# Patient Record
Sex: FEMALE | Race: WHITE | NOT HISPANIC OR LATINO | Employment: OTHER | URBAN - METROPOLITAN AREA
[De-identification: names, ages, dates, MRNs, and addresses within clinical notes are randomized per-mention and may not be internally consistent; named-entity substitution may affect disease eponyms.]

---

## 2022-07-20 ENCOUNTER — LAB REQUISITION (OUTPATIENT)
Dept: LAB | Facility: CLINIC | Age: 67
End: 2022-07-20

## 2022-07-20 LAB
ESTRADIOL SERPL-MCNC: 16 PG/ML
PROGEST SERPL-MCNC: 1.2 NG/ML

## 2022-07-20 PROCEDURE — 82670 ASSAY OF TOTAL ESTRADIOL: CPT | Performed by: FAMILY MEDICINE

## 2022-07-20 PROCEDURE — 84144 ASSAY OF PROGESTERONE: CPT | Performed by: FAMILY MEDICINE

## 2022-07-28 LAB
HOLD SPECIMEN: NORMAL
HOLD SPECIMEN: NORMAL

## 2022-12-06 ENCOUNTER — HOSPITAL ENCOUNTER (EMERGENCY)
Facility: CLINIC | Age: 67
Discharge: HOME | End: 2022-12-06
Attending: PHYSICIAN ASSISTANT | Admitting: PHYSICIAN ASSISTANT
Payer: MEDICARE

## 2022-12-06 ENCOUNTER — APPOINTMENT (OUTPATIENT)
Dept: CT IMAGING | Facility: CLINIC | Age: 67
End: 2022-12-06
Attending: PHYSICIAN ASSISTANT
Payer: MEDICARE

## 2022-12-06 VITALS
OXYGEN SATURATION: 99 % | SYSTOLIC BLOOD PRESSURE: 132 MMHG | HEART RATE: 62 BPM | WEIGHT: 140 LBS | DIASTOLIC BLOOD PRESSURE: 84 MMHG | RESPIRATION RATE: 24 BRPM | BODY MASS INDEX: 21.22 KG/M2 | TEMPERATURE: 97.5 F | HEIGHT: 68 IN

## 2022-12-06 DIAGNOSIS — K62.5 BRBPR (BRIGHT RED BLOOD PER RECTUM): ICD-10-CM

## 2022-12-06 DIAGNOSIS — K52.9 COLITIS: Primary | ICD-10-CM

## 2022-12-06 LAB
ABO/RH(D): NORMAL
ALBUMIN SERPL BCG-MCNC: 4.4 G/DL (ref 3.5–5.2)
ALP SERPL-CCNC: 53 U/L (ref 35–104)
ALT SERPL W P-5'-P-CCNC: 18 U/L (ref 10–35)
ANION GAP SERPL CALCULATED.3IONS-SCNC: 11 MMOL/L (ref 7–15)
ANTIBODY SCREEN: NEGATIVE
AST SERPL W P-5'-P-CCNC: 25 U/L (ref 10–35)
BASOPHILS # BLD AUTO: 0 10E3/UL (ref 0–0.2)
BASOPHILS NFR BLD AUTO: 0 %
BILIRUB DIRECT SERPL-MCNC: <0.2 MG/DL (ref 0–0.3)
BILIRUB SERPL-MCNC: 0.9 MG/DL
BUN SERPL-MCNC: 15.6 MG/DL (ref 8–23)
C COLI+JEJUNI+LARI FUSA STL QL NAA+PROBE: NOT DETECTED
CALCIUM SERPL-MCNC: 8.8 MG/DL (ref 8.8–10.2)
CHLORIDE SERPL-SCNC: 100 MMOL/L (ref 98–107)
CREAT SERPL-MCNC: 0.82 MG/DL (ref 0.51–0.95)
DEPRECATED HCO3 PLAS-SCNC: 24 MMOL/L (ref 22–29)
EC STX1 GENE STL QL NAA+PROBE: NOT DETECTED
EC STX2 GENE STL QL NAA+PROBE: NOT DETECTED
EOSINOPHIL # BLD AUTO: 0 10E3/UL (ref 0–0.7)
EOSINOPHIL NFR BLD AUTO: 1 %
ERYTHROCYTE [DISTWIDTH] IN BLOOD BY AUTOMATED COUNT: 13.6 % (ref 10–15)
GFR SERPL CREATININE-BSD FRML MDRD: 78 ML/MIN/1.73M2
GLUCOSE SERPL-MCNC: 124 MG/DL (ref 70–99)
HCT VFR BLD AUTO: 41.8 % (ref 35–47)
HGB BLD-MCNC: 13.6 G/DL (ref 11.7–15.7)
HOLD SPECIMEN: NORMAL
IMM GRANULOCYTES # BLD: 0 10E3/UL
IMM GRANULOCYTES NFR BLD: 0 %
LYMPHOCYTES # BLD AUTO: 1.7 10E3/UL (ref 0.8–5.3)
LYMPHOCYTES NFR BLD AUTO: 19 %
MCH RBC QN AUTO: 30.6 PG (ref 26.5–33)
MCHC RBC AUTO-ENTMCNC: 32.5 G/DL (ref 31.5–36.5)
MCV RBC AUTO: 94 FL (ref 78–100)
MONOCYTES # BLD AUTO: 0.6 10E3/UL (ref 0–1.3)
MONOCYTES NFR BLD AUTO: 7 %
NEUTROPHILS # BLD AUTO: 6.5 10E3/UL (ref 1.6–8.3)
NEUTROPHILS NFR BLD AUTO: 73 %
NOROV GI+II ORF1-ORF2 JNC STL QL NAA+PR: NOT DETECTED
NRBC # BLD AUTO: 0 10E3/UL
NRBC BLD AUTO-RTO: 0 /100
PLATELET # BLD AUTO: 229 10E3/UL (ref 150–450)
POTASSIUM SERPL-SCNC: 3.6 MMOL/L (ref 3.4–5.3)
PROT SERPL-MCNC: 6.6 G/DL (ref 6.4–8.3)
RBC # BLD AUTO: 4.44 10E6/UL (ref 3.8–5.2)
RVA NSP5 STL QL NAA+PROBE: NOT DETECTED
SALMONELLA SP RPOD STL QL NAA+PROBE: NOT DETECTED
SHIGELLA SP+EIEC IPAH STL QL NAA+PROBE: NOT DETECTED
SODIUM SERPL-SCNC: 135 MMOL/L (ref 136–145)
SPECIMEN EXPIRATION DATE: NORMAL
V CHOL+PARA RFBL+TRKH+TNAA STL QL NAA+PR: NOT DETECTED
WBC # BLD AUTO: 8.9 10E3/UL (ref 4–11)
Y ENTERO RECN STL QL NAA+PROBE: NOT DETECTED

## 2022-12-06 PROCEDURE — 85025 COMPLETE CBC W/AUTO DIFF WBC: CPT | Performed by: PHYSICIAN ASSISTANT

## 2022-12-06 PROCEDURE — 99285 EMERGENCY DEPT VISIT HI MDM: CPT | Mod: 25

## 2022-12-06 PROCEDURE — 82248 BILIRUBIN DIRECT: CPT | Performed by: PHYSICIAN ASSISTANT

## 2022-12-06 PROCEDURE — 80053 COMPREHEN METABOLIC PANEL: CPT | Performed by: EMERGENCY MEDICINE

## 2022-12-06 PROCEDURE — 80048 BASIC METABOLIC PNL TOTAL CA: CPT | Performed by: PHYSICIAN ASSISTANT

## 2022-12-06 PROCEDURE — 74177 CT ABD & PELVIS W/CONTRAST: CPT | Mod: MA

## 2022-12-06 PROCEDURE — 85025 COMPLETE CBC W/AUTO DIFF WBC: CPT | Performed by: EMERGENCY MEDICINE

## 2022-12-06 PROCEDURE — 86901 BLOOD TYPING SEROLOGIC RH(D): CPT | Performed by: PHYSICIAN ASSISTANT

## 2022-12-06 PROCEDURE — 250N000011 HC RX IP 250 OP 636: Performed by: PHYSICIAN ASSISTANT

## 2022-12-06 PROCEDURE — 36415 COLL VENOUS BLD VENIPUNCTURE: CPT | Performed by: EMERGENCY MEDICINE

## 2022-12-06 PROCEDURE — 87506 IADNA-DNA/RNA PROBE TQ 6-11: CPT | Performed by: PHYSICIAN ASSISTANT

## 2022-12-06 RX ORDER — IOPAMIDOL 755 MG/ML
500 INJECTION, SOLUTION INTRAVASCULAR ONCE
Status: COMPLETED | OUTPATIENT
Start: 2022-12-06 | End: 2022-12-06

## 2022-12-06 RX ADMIN — IOPAMIDOL 71 ML: 755 INJECTION, SOLUTION INTRAVENOUS at 10:43

## 2022-12-06 ASSESSMENT — ENCOUNTER SYMPTOMS
BLOOD IN STOOL: 1
ABDOMINAL PAIN: 1

## 2022-12-06 ASSESSMENT — ACTIVITIES OF DAILY LIVING (ADL): ADLS_ACUITY_SCORE: 35

## 2022-12-06 NOTE — ED PROVIDER NOTES
History     Chief Complaint:  Rectal Bleeding       HPI   Diana Mcgovern is a 67 year old female without cardiac history who presents to the ED for evaluation of abdominal pain and BRBPR.  Patient reports developing diffuse abdominal pain that began around 1600 yesterday.  This is been relatively constant since onset.  Described as cramping.  Some alleviation with passage of stool.  No other alleviating or aggravating factors.  Patient was having multiple episodes of diarrhea, and this a.m. attempted to pass gas and noticed some BRB SC in the bed.  She then had another bowel movement which she describes as straight blood in the toilet, prompting presentation to the ED for further evaluation.  She does note some alleviation of her abdominal discomfort at this time.  No fevers or chills.  She did note an episode of nausea while here and attempting to have an episode of emesis without any output.  No urinary symptoms.  No rectal pain.  No history of hemorrhoids. No chest pain or dyspnea.    ROS:  Review of Systems   Gastrointestinal: Positive for abdominal pain and blood in stool.   All other systems reviewed and are negative.      Allergies:  Penicillin G  Sulfa Drugs     Medications:    ESTRADIOL (MINIVELLE TD)   Apply on dry, clean, hairless skin.   0     Active   progesterone micronized (PROMETRIUM) 200 mg capsule   Take 200 mg by mouth at bedtime.   0     Active   ondansetron (ZOFRAN ODT) 8 mg disintegrating tablet   Place 1 tablet on the tongue every 6 hours if needed for Nausea/Vomiting. 8 tablet   0 11/03/2014   Active   multivitamin (MVI) tablet   Take 1 tablet by mouth once daily.   0 12/05/2017   Active   cholecalciferol (VITAMIN D) 1,000 unit capsule   Take 1 capsule by mouth once daily.   0 01/11/2018   Active   metroNIDAZOLE 0.75 % cream    Indications: Rosacea Apply and rub a thin film twice daily, morning and evening, to entire affected areas after washing 45 g   1 10/16/2018   Active   predniSONE  "(DELTASONE) 20 mg tablet    Indications: Eustachian tube dysfunction, bilateral               Past Medical History:    Female infertility of unspecified origin       Polyp of corpus uteri 3/06 Endometrial stripe 4.6mm with 2 polyps seen on subsequent sonohystogram; pt offered conservative mgmt with repeat sonohystogram in 6-9 months vs. D&C polypectomy now; will plan sonohystogram later this year for follow up   Nonspecific abnormal results of thyroid function study 3/06 TSH minimally elevated, normal T4, T3 and negative autoantibodies   Other allergy, other than to medicinal agents         Past Surgical History:    Past Surgical History:   Procedure Laterality Date     LAPAROSCOPY  1994        Family History:    family history includes Family History Negative in her father and mother.    Social History:   reports that she has never smoked. She has never used smokeless tobacco. She reports current alcohol use. She reports that she does not use drugs.  PCP: No primary care provider on file.     Physical Exam     Patient Vitals for the past 24 hrs:   BP Temp Temp src Pulse Resp SpO2 Height Weight   12/06/22 1153 -- -- -- -- -- 99 % -- --   12/06/22 1152 132/84 -- -- 62 -- -- -- --   12/06/22 1030 118/81 -- -- (!) 43 -- 100 % -- --   12/06/22 0458 122/80 97.5  F (36.4  C) Temporal 83 24 100 % 1.715 m (5' 7.5\") 63.5 kg (140 lb)        Physical Exam  Constitutional: Pleasant. Cooperative.   Eyes: Pupils equally round and reactive  HENT: Head is normal in appearance. Oropharynx is normal with moist mucus membranes.  Cardiovascular: Regular rate and rhythm and without murmurs.  Respiratory: Normal respiratory effort, lungs are clear bilaterally.  GI: Mild diffuse TTP. Soft, non-distended. No guarding, rebound, or rigidity.  Musculoskeletal: No asymmetry of the lower extremities.  Skin: Normal, without rash.  Neurologic: Cranial nerves grossly intact, normal cognition, no focal deficits. Alert and oriented x 3. "   Psychiatric: Normal affect.  Nursing notes and vital signs reviewed.      Emergency Department Course     Imaging:  CT Abdomen Pelvis w Contrast   Preliminary Result   IMPRESSION:    1.  Distal colitis. No obstruction or abscess.   2.  Trace pelvic fluid.         Laboratory:  Labs Ordered and Resulted from Time of ED Arrival to Time of ED Departure   BASIC METABOLIC PANEL - Abnormal       Result Value    Sodium 135 (*)     Potassium 3.6      Chloride 100      Carbon Dioxide (CO2) 24      Anion Gap 11      Urea Nitrogen 15.6      Creatinine 0.82      Calcium 8.8      Glucose 124 (*)     GFR Estimate 78     HEPATIC FUNCTION PANEL - Normal    Protein Total 6.6      Albumin 4.4      Bilirubin Total 0.9      Alkaline Phosphatase 53      AST 25      ALT 18      Bilirubin Direct <0.20     CBC WITH PLATELETS AND DIFFERENTIAL    WBC Count 8.9      RBC Count 4.44      Hemoglobin 13.6      Hematocrit 41.8      MCV 94      MCH 30.6      MCHC 32.5      RDW 13.6      Platelet Count 229      % Neutrophils 73      % Lymphocytes 19      % Monocytes 7      % Eosinophils 1      % Basophils 0      % Immature Granulocytes 0      NRBCs per 100 WBC 0      Absolute Neutrophils 6.5      Absolute Lymphocytes 1.7      Absolute Monocytes 0.6      Absolute Eosinophils 0.0      Absolute Basophils 0.0      Absolute Immature Granulocytes 0.0      Absolute NRBCs 0.0     TYPE AND SCREEN, ADULT    ABO/RH(D) A POS      Antibody Screen Negative      SPECIMEN EXPIRATION DATE [id number]     ENTERIC BACTERIA AND VIRUS PANEL BY [facility] STOOL   ABO/RH TYPE AND SCREEN      Emergency Department Course:    Reviewed:  I reviewed nursing notes, vitals, past medical history and Care Everywhere    Assessments:   I obtained history and examined the patient as noted above.    I rechecked the patient and explained findings.     Interventions:  Medications   iopamidol (ISOVUE-370) solution 500 mL (71 mLs Intravenous Given 12/6/22 1043)   sodium chloride (PF) 0.9% PF  flush 100 mL (58 mLs Intravenous Given 12/6/22 1047)        Disposition:  The patient was discharged to home.     Impression & Plan      Medical Decision Making:  Diana Mcgovern is a 67 year old female who presents to the ED for evaluation of abdominal pain and BRBPR.  See HPI as above for additional details.  Vitals and physical exam as above.  Differentials broad included diverticulosis, colitis, infectious versus inflammatory, internal hemorrhoid, external hemorrhoid, AVM, fissure, amongst others.  Work-up obtained as above notable for distal colitis.  No history of inflammatory bowel disease, suspect this is infectious.  Patient able to provide sample here which I will send for culture.  Lower suspicion for remainder of differential at this time.  Will defer on rectal exam after discussion with patient as she denies any rectal pain.  Given patient's reassuring vital signs, no evidence for anemia, will send patient to home and advised her to push fluids.  Emergent colorectal follow-up placed for patient.  Discussed low threshold to return with any new or worsening symptoms.  [place] patient was safe for discharge to home. Discussed reasons to return. All questions answered. Patient discharged to home in stable condition.    Diagnosis:    ICD-10-CM    1. Colitis  K52.9 Adult Colorectal Surgery  Referral      2. BRBPR (bright red blood per rectum)  K62.5 Adult Colorectal Surgery  Referral           Discharge Medications:  There are no discharge medications for this patient.       12/6/2022   Rene Diaz PA-C     This record was created at least in part using electronic voice recognition software, so please excuse any typographical errors.       Rene Diaz PA-C  12/06/22 [id number]

## 2022-12-06 NOTE — ED TRIAGE NOTES
Pt aox4, ABCs intact. Pt c/o abd cramping x12 hours, had a near syncopal episode in the bathroom around 1600 yesterday. This AM she noticed there was blood on her bed sheets. Patient then noticed bright red blood per her rectum. Patient denies feeling nauseated or light headed right now.

## 2022-12-06 NOTE — DISCHARGE INSTRUCTIONS
You have evidence for colitis, likely infectious in cause as you do not have any history of inflammatory bowel disease. You have provided a stool sample for us, which we will test for viruses/bacteria. Continue to monitor bloody stool output. If you develop shortness of breath, chest pain, significant lightheadedness, loss of consciousness, return to the emergency department. I have placed an emergency colorectal consult given the amount of blood that you describe. I do suspect that this will resolve spontaneously, however should it not please follow up closely with colorectal.

## 2022-12-07 NOTE — RESULT ENCOUNTER NOTE
Final Enteric Bacteria and Virus Panel by [facility] Stool is NEGATIVE for all tested organisms (bacteria/virus).  No treatment or change in treatment per [facility] Result Enteric Bacteria and Virus Panel protocol.